# Patient Record
Sex: MALE | Race: WHITE | NOT HISPANIC OR LATINO | Employment: OTHER | URBAN - METROPOLITAN AREA
[De-identification: names, ages, dates, MRNs, and addresses within clinical notes are randomized per-mention and may not be internally consistent; named-entity substitution may affect disease eponyms.]

---

## 2020-07-23 ENCOUNTER — AMBULATORY - RIVER FALLS (OUTPATIENT)
Dept: FAMILY MEDICINE | Facility: CLINIC | Age: 80
End: 2020-07-23

## 2021-12-21 DIAGNOSIS — Z11.59 ENCOUNTER FOR SCREENING FOR OTHER VIRAL DISEASES: ICD-10-CM

## 2021-12-27 RX ORDER — LOSARTAN POTASSIUM 50 MG/1
50 TABLET ORAL DAILY
COMMUNITY

## 2021-12-27 RX ORDER — HYDROCHLOROTHIAZIDE 12.5 MG/1
12.5 TABLET ORAL AT BEDTIME
COMMUNITY

## 2021-12-27 RX ORDER — GINGER ROOT 550 MG
550 CAPSULE ORAL DAILY
COMMUNITY

## 2021-12-27 RX ORDER — MULTIPLE VITAMINS W/ MINERALS TAB 9MG-400MCG
1 TAB ORAL DAILY
COMMUNITY

## 2021-12-27 RX ORDER — SODIUM PHOSPHATE,MONO-DIBASIC 19G-7G/118
500 ENEMA (ML) RECTAL DAILY
COMMUNITY

## 2021-12-27 RX ORDER — HYDROCODONE BITARTRATE AND ACETAMINOPHEN 5; 325 MG/1; MG/1
1 TABLET ORAL EVERY 6 HOURS PRN
COMMUNITY
End: 2021-12-27

## 2021-12-27 ASSESSMENT — MIFFLIN-ST. JEOR: SCORE: 1813.49

## 2021-12-27 NOTE — PROVIDER NOTIFICATION
Plans to discharge to home on POD 1 in the morning with his wife and his son will also be helping him.       12/27/21 1413   Discharge Planning   Patient/Family Anticipates Transition to home with family   Living Arrangements   People in home spouse   Type of Residence Private Residence   Is your private residence a single family home or apartment? Single family home   Number of Stairs, Within Home, Primary 3   Once home, are you able to live on one level? Yes   Which level? Main Level   Bathroom Shower/Tub Walk-in shower   Equipment Currently Used at Home grab bar, tub/shower;shower chair;grab bar, toilet;raised toilet seat  (Has a cane and a walker to use after surgery)   Support System   Support Systems Spouse/Significant Other;Children  (wife, Jessica, and son, Cornelio)   Do you have someone available to stay with you one or two nights once you are home? Yes   Medical Clearance   Clinic Name Cardiologist   It is recommended that you call and check with any specialty providers before surgery to see if you need surgical clearance.  Do you see any specialty providers outside of your primary care provider? Yes   Blood   Known bleeding disorder or coagulopathy? No   Education   Patient attended total joint pre-op class/received pre-op teaching  email/phone call

## 2022-01-03 LAB — EJECTION FRACTION: 32 %

## 2022-01-04 ENCOUNTER — LAB (OUTPATIENT)
Dept: LAB | Facility: CLINIC | Age: 82
End: 2022-01-04
Attending: ORTHOPAEDIC SURGERY
Payer: MEDICARE

## 2022-01-04 DIAGNOSIS — Z11.59 ENCOUNTER FOR SCREENING FOR OTHER VIRAL DISEASES: ICD-10-CM

## 2022-01-04 PROCEDURE — U0005 INFEC AGEN DETEC AMPLI PROBE: HCPCS

## 2022-01-04 PROCEDURE — U0003 INFECTIOUS AGENT DETECTION BY NUCLEIC ACID (DNA OR RNA); SEVERE ACUTE RESPIRATORY SYNDROME CORONAVIRUS 2 (SARS-COV-2) (CORONAVIRUS DISEASE [COVID-19]), AMPLIFIED PROBE TECHNIQUE, MAKING USE OF HIGH THROUGHPUT TECHNOLOGIES AS DESCRIBED BY CMS-2020-01-R: HCPCS

## 2022-01-05 LAB — SARS-COV-2 RNA RESP QL NAA+PROBE: NEGATIVE

## 2022-01-06 ENCOUNTER — ANESTHESIA EVENT (OUTPATIENT)
Dept: SURGERY | Facility: CLINIC | Age: 82
End: 2022-01-06
Payer: MEDICARE

## 2022-01-06 NOTE — TREATMENT PLAN
Orthopedic Surgery Pre-Op Plan: Michael Crane  pre-op review. This is NOT an H&P   Surgeon: Dr. Shah   [facility]: [facility]  Name of Surgery: Left Total Knee Arthroplasty   Date of Surgery: 1/7/22  H&P: Completed 12/29/21 by Pao Conrad CNP, at [facility]  History of ASA, NSAIDS, vitamin and/or herbal supplements within 10 days: Yes- Apple Cider Vinegar, Emely Root, Glucosamine, Multivitamin, Prostata Prostate supplement, Saw Palmetto- instructed to hold these supplements for 7 days before surgery.   History of blood thinners: Yes- on apixaban (Eliquis) for Atrial Fibrillation. Instructed to hold Eliquis for 3 days before surgery (last dose at 0700 on 1/4/22, then held), to allow for possible spinal anesthesia.     Plan:   1) Discharge Plan: Home POD 1 with assist of Wife and Son. Please see Discharge Planning section near bottom of this note for further details.     2) Paroxysmal Atrial Fibrillation: Recently diagnosed in Nov 2021. Heart rate well-controlled and patient denies any symptoms. Echo on 11/17/21: showed normal systolic function with LVEF of 55%. Mild concentric increased left ventricular wall thickness. No definite wall motion abnormality. No significant valvular abnormalities. On Eliquis for stroke prevention. Cardiology ok with 2 or 3 day Eliquis hold before knee surgery. After surgery will resume Eliquis as soon as safe from a surgical bleeding standpoint per Dr. Shah.     3) PVCs with NSVT: recent Holter monitor showed 28.9% ventricular ectopy.     4) Dyspnea on Exertion: NM Stress Test on 1/3/22: showed no evidence of infarct or ischemia. Per cardiology: ok for patient to proceed with knee surgery from a heart standpoint.     5) Hyperlipidemia: On atorvastatin.    6) Hypertension: Appears well controlled on losartan and hydrochlorothiazide.  Patient instructed to hold losartan and hydrochlorothiazide on the morning of surgery.    7) Obstructive Sleep Apnea: patient declined to use  CPAP.  I recommend close monitoring of postop respiratory status.  If frequent O2 desats or apneic episodes, nursing to please notify hospitalist.    8) Type 2 Diabetes Mellitus: A1c 6.4 on 12/28/2021.  Shows good recent control without any medications. I recommend blood glucose checks at least three times a day and at bedtime during hospital stay. Goal BG < 180 to decrease risk for infection and wound healing complications. Nursing to please notify Hospitalist if BG > 180.      9) PONV:  I recommend patient discusses this with preop nursing and anesthesia.  Would likely benefit from antiemetics and other measures to prevent or minimize nausea/vomiting.    10) Benign Prostatic Hypertrophy (BPH): At increased risk for postop urinary retention.  I recommend close monitoring with frequent bladder scanning as needed.    Patient appears medically optimized for upcoming surgery. I would recommend Hospitalist Consult to assist with medical management. Please call me below with any questions on this patient.       Review of Systems Notable for: Paroxysmal atrial fibrillation, PVCs with NSVT, dyspnea on exertion-negative stress test 1/3/2022-cleared by cardiology for surgery, hyperlipidemia, hypertension, obstructive sleep apnea-does not use CPAP, type 2 diabetes mellitus, PONV, BPH.    Past Medical History:   Past Medical History:   Diagnosis Date     Antiplatelet or antithrombotic long-term use      Arthritis      Atrial fibrillation (H)      BPH (benign prostatic hyperplasia)      Diverticulosis      Hyperlipidemia      Hypertension      Kidney stones      Left ventricular hypertrophy      Obese      PONV (postoperative nausea and vomiting)      PVCs (premature ventricular contractions)      Sleep apnea     borderline MEGAN     Past Surgical History:   Procedure Laterality Date     CARPAL TUNNEL RELEASE RT/LT Left 02/2021     COLONOSCOPY  2017     HERNIA REPAIR      2004 and 2007     PROSTATE BIOPSY       TURP        URETEROSCOPY  2018    with removal of kidney stone       Current Medications:  Patient's Medications   New Prescriptions    No medications on file   Previous Medications    APIXABAN ANTICOAGULANT (ELIQUIS) 5 MG TABLET    Take 5 mg by mouth 2 times daily Stopping 1/3/22 before surgery    APPLE CIDER VINEGAR PO    Take 1 chew tab by mouth 3 times daily as needed     DIPHENHYDRAMINE-ACETAMINOPHEN (TYLENOL PM)  MG TABLET    Take 1 tablet by mouth nightly as needed for sleep    GINGER, ZINGIBER OFFICINALIS, (GINGER ROOT) 550 MG CAPS CAPSULE    Take 550 mg by mouth daily    GLUCOSAMINE 500 MG CAPS CAPSULE    Take 500 mg by mouth daily Stopping 12/31/21 before surgery    HYDROCHLOROTHIAZIDE (HYDRODIURIL) 12.5 MG TABLET    Take 12.5 mg by mouth daily    LOSARTAN (COZAAR) 50 MG TABLET    Take 50 mg by mouth daily    MULTIVITAMIN W/MINERALS (THERA-VIT-M) TABLET    Take 1 tablet by mouth daily Stopping 12/31/21 before surgery    SPECIALTY VITAMINS PRODUCTS (PROSTATE PO)    Take 1 capsule by mouth daily Prostata supplement   Stopping 12/31/21 before surgery    ZN-PYG AFRI-NETTLE-SAW PALMET (SAW PALMETTO COMPLEX PO)    Take 1 tablet by mouth daily Stopping 12/31/21 before surgery   Modified Medications    No medications on file   Discontinued Medications    ASPIRIN-CAFFEINE (ANACIN PO)    Take 1 tablet by mouth 3 times daily as needed    HYDROCODONE-ACETAMINOPHEN (NORCO) 5-325 MG TABLET    Take 1 tablet by mouth every 6 hours as needed for pain (0.5 to 1 tablet)       ALLERGIES:  Allergies   Allergen Reactions     Hydromorphone Nausea and Vomiting     Penicillins Rash       Social History  Social History     Tobacco Use     Smoking status: Former Smoker     Smokeless tobacco: Never Used   Substance Use Topics     Alcohol use: Yes     Comment: occasional     Drug use: Never       Any Abnormal Recent Diagnostics? Yes  Hemoglobin A1c 6.4 on 12/28/2021: Shows good control of type 2 diabetes without any medications.  Blood  glucose 124 on 12/28/2021: Has type 2 diabetes-we will monitor BG's closely during hospital stay.  Goal BG <180 to decrease risk for infection and postop wound healing complications.    Discharge Planning:   Plans to discharge to home on POD 1 in the morning with his wife and his son will also be helping him.        12/27/21 1413   Discharge Planning   Patient/Family Anticipates Transition to home with family   Living Arrangements   People in home spouse   Type of Residence Private Residence   Is your private residence a single family home or apartment? Single family home   Number of Stairs, Within Home, Primary 3   Once home, are you able to live on one level? Yes   Which level? Main Level   Bathroom Shower/Tub Walk-in shower   Equipment Currently Used at Home grab bar, tub/shower;shower chair;grab bar, toilet;raised toilet seat  (Has a cane and a walker to use after surgery)   Support System   Support Systems Spouse/Significant Other;Children  (wife, Jessica, and son, Cornelio)   Do you have someone available to stay with you one or two nights once you are home? Yes   Medical Clearance   Clinic Name Cardiologist   It is recommended that you call and check with any specialty providers before surgery to see if you need surgical clearance.  Do you see any specialty providers outside of your primary care provider? Yes   Blood   Known bleeding disorder or coagulopathy? No   Education   Patient attended total joint pre-op class/received pre-op teaching  email/phone call            ROSELINE Shah CNP   Advanced Practice Nurse Navigator- Orthopedics  [facility]   Phone: [phone]

## 2022-01-07 ENCOUNTER — APPOINTMENT (OUTPATIENT)
Dept: PHYSICAL THERAPY | Facility: CLINIC | Age: 82
End: 2022-01-07
Attending: ORTHOPAEDIC SURGERY
Payer: MEDICARE

## 2022-01-07 ENCOUNTER — HOSPITAL ENCOUNTER (OUTPATIENT)
Facility: CLINIC | Age: 82
Discharge: HOME | End: 2022-01-08
Attending: ORTHOPAEDIC SURGERY | Admitting: ORTHOPAEDIC SURGERY
Payer: MEDICARE

## 2022-01-07 ENCOUNTER — ANESTHESIA (OUTPATIENT)
Dept: SURGERY | Facility: CLINIC | Age: 82
End: 2022-01-07
Payer: MEDICARE

## 2022-01-07 DIAGNOSIS — M17.12 PRIMARY OSTEOARTHRITIS OF LEFT KNEE: Primary | ICD-10-CM

## 2022-01-07 DIAGNOSIS — Z96.652 STATUS POST TOTAL LEFT KNEE REPLACEMENT: Chronic | ICD-10-CM

## 2022-01-07 PROBLEM — N20.1 URETERAL STONE: Status: ACTIVE | Noted: 2018-07-31

## 2022-01-07 PROBLEM — M19.90 OA (OSTEOARTHRITIS): Chronic | Status: ACTIVE | Noted: 2022-01-07

## 2022-01-07 PROBLEM — E11.9 DIABETES MELLITUS (H): Status: ACTIVE | Noted: 2022-01-07

## 2022-01-07 PROBLEM — M19.90 OA (OSTEOARTHRITIS): Status: ACTIVE | Noted: 2022-01-07

## 2022-01-07 PROBLEM — I10 HYPERTENSION: Status: ACTIVE | Noted: 2022-01-07

## 2022-01-07 PROBLEM — N40.0 BENIGN PROSTATIC HYPERPLASIA: Status: ACTIVE | Noted: 2022-01-07

## 2022-01-07 PROBLEM — Z79.01 CHRONIC ANTICOAGULATION: Status: ACTIVE | Noted: 2022-01-07

## 2022-01-07 PROBLEM — E78.5 HYPERLIPIDEMIA: Status: ACTIVE | Noted: 2021-12-28

## 2022-01-07 PROBLEM — I48.91 ATRIAL FIBRILLATION (H): Status: ACTIVE | Noted: 2021-12-28

## 2022-01-07 PROBLEM — G47.33 OBSTRUCTIVE SLEEP APNEA SYNDROME: Status: ACTIVE | Noted: 2022-01-07

## 2022-01-07 PROBLEM — Z79.01 CHRONIC ANTICOAGULATION: Chronic | Status: ACTIVE | Noted: 2022-01-07

## 2022-01-07 PROBLEM — E11.9 DIABETES MELLITUS (H): Chronic | Status: ACTIVE | Noted: 2022-01-07

## 2022-01-07 PROBLEM — I48.91 ATRIAL FIBRILLATION (H): Chronic | Status: ACTIVE | Noted: 2021-12-28

## 2022-01-07 PROBLEM — I10 HYPERTENSION: Chronic | Status: ACTIVE | Noted: 2022-01-07

## 2022-01-07 LAB
ATRIAL RATE - MUSE: 54 BPM
DIASTOLIC BLOOD PRESSURE - MUSE: NORMAL MMHG
GLUCOSE BLDC GLUCOMTR-MCNC: 126 MG/DL (ref 70–99)
GLUCOSE BLDC GLUCOMTR-MCNC: 134 MG/DL (ref 70–99)
GLUCOSE BLDC GLUCOMTR-MCNC: 142 MG/DL (ref 70–99)
INTERPRETATION ECG - MUSE: NORMAL
P AXIS - MUSE: 58 DEGREES
PR INTERVAL - MUSE: 196 MS
QRS DURATION - MUSE: 180 MS
QT - MUSE: 468 MS
QTC - MUSE: 443 MS
R AXIS - MUSE: 58 DEGREES
SYSTOLIC BLOOD PRESSURE - MUSE: NORMAL MMHG
T AXIS - MUSE: 51 DEGREES
VENTRICULAR RATE- MUSE: 54 BPM

## 2022-01-07 PROCEDURE — 97116 GAIT TRAINING THERAPY: CPT | Mod: GP

## 2022-01-07 PROCEDURE — 250N000009 HC RX 250: Performed by: NURSE ANESTHETIST, CERTIFIED REGISTERED

## 2022-01-07 PROCEDURE — 250N000011 HC RX IP 250 OP 636: Performed by: ANESTHESIOLOGY

## 2022-01-07 PROCEDURE — 710N000010 HC RECOVERY PHASE 1, LEVEL 2, PER MIN: Performed by: ORTHOPAEDIC SURGERY

## 2022-01-07 PROCEDURE — 250N000013 HC RX MED GY IP 250 OP 250 PS 637: Performed by: ORTHOPAEDIC SURGERY

## 2022-01-07 PROCEDURE — 250N000009 HC RX 250: Performed by: ORTHOPAEDIC SURGERY

## 2022-01-07 PROCEDURE — 250N000013 HC RX MED GY IP 250 OP 250 PS 637: Performed by: ANESTHESIOLOGY

## 2022-01-07 PROCEDURE — 250N000011 HC RX IP 250 OP 636: Performed by: NURSE ANESTHETIST, CERTIFIED REGISTERED

## 2022-01-07 PROCEDURE — 97110 THERAPEUTIC EXERCISES: CPT | Mod: GP

## 2022-01-07 PROCEDURE — 360N000077 HC SURGERY LEVEL 4, PER MIN: Performed by: ORTHOPAEDIC SURGERY

## 2022-01-07 PROCEDURE — 93005 ELECTROCARDIOGRAM TRACING: CPT

## 2022-01-07 PROCEDURE — 82962 GLUCOSE BLOOD TEST: CPT | Mod: 91

## 2022-01-07 PROCEDURE — 93010 ELECTROCARDIOGRAM REPORT: CPT | Mod: HOP | Performed by: GENERAL ACUTE CARE HOSPITAL

## 2022-01-07 PROCEDURE — 250N000009 HC RX 250: Performed by: ANESTHESIOLOGY

## 2022-01-07 PROCEDURE — 250N000009 HC RX 250: Performed by: PHYSICIAN ASSISTANT

## 2022-01-07 PROCEDURE — 97162 PT EVAL MOD COMPLEX 30 MIN: CPT | Mod: GP

## 2022-01-07 PROCEDURE — 370N000017 HC ANESTHESIA TECHNICAL FEE, PER MIN: Performed by: ORTHOPAEDIC SURGERY

## 2022-01-07 PROCEDURE — 258N000003 HC RX IP 258 OP 636: Performed by: ANESTHESIOLOGY

## 2022-01-07 PROCEDURE — 250N000011 HC RX IP 250 OP 636: Performed by: ORTHOPAEDIC SURGERY

## 2022-01-07 PROCEDURE — C1776 JOINT DEVICE (IMPLANTABLE): HCPCS | Performed by: ORTHOPAEDIC SURGERY

## 2022-01-07 PROCEDURE — 999N000141 HC STATISTIC PRE-PROCEDURE NURSING ASSESSMENT: Performed by: ORTHOPAEDIC SURGERY

## 2022-01-07 PROCEDURE — 258N000003 HC RX IP 258 OP 636: Performed by: ORTHOPAEDIC SURGERY

## 2022-01-07 PROCEDURE — 258N000001 HC RX 258: Performed by: ORTHOPAEDIC SURGERY

## 2022-01-07 PROCEDURE — 258N000003 HC RX IP 258 OP 636: Performed by: NURSE ANESTHETIST, CERTIFIED REGISTERED

## 2022-01-07 PROCEDURE — 272N000001 HC OR GENERAL SUPPLY STERILE: Performed by: ORTHOPAEDIC SURGERY

## 2022-01-07 PROCEDURE — 250N000013 HC RX MED GY IP 250 OP 250 PS 637: Performed by: PHYSICIAN ASSISTANT

## 2022-01-07 PROCEDURE — 278N000051 HC OR IMPLANT GENERAL: Performed by: ORTHOPAEDIC SURGERY

## 2022-01-07 PROCEDURE — 250N000011 HC RX IP 250 OP 636: Performed by: PHYSICIAN ASSISTANT

## 2022-01-07 PROCEDURE — 99219 PR INITIAL OBSERVATION CARE,LEVEL II: CPT | Performed by: INTERNAL MEDICINE

## 2022-01-07 DEVICE — CEMENT BONE SIMPLEX HV 40G W/GENTA .5G: Type: IMPLANTABLE DEVICE | Site: KNEE | Status: FUNCTIONAL

## 2022-01-07 DEVICE — LEGION CRUCIATE RETAINING                                    NONPOROUS FEMORAL SIZE 6 LEFT
Type: IMPLANTABLE DEVICE | Site: KNEE | Status: FUNCTIONAL
Brand: LEGION

## 2022-01-07 DEVICE — GENESIS II NON-POROUS TIBIAL                                    BASEPLATE SIZE 6 LT
Type: IMPLANTABLE DEVICE | Site: KNEE | Status: FUNCTIONAL
Brand: GENESIS II

## 2022-01-07 DEVICE — LEGION CRUCIATE RETAINING HIGH                                    FLEX HIGHLY CROSS LINKED                                    POLYETHYLENE SIZE 5-6 11MM
Type: IMPLANTABLE DEVICE | Site: KNEE | Status: FUNCTIONAL
Brand: LEGION

## 2022-01-07 DEVICE — GENESIS II RESURFACING PATELLAR 35MM
Type: IMPLANTABLE DEVICE | Site: KNEE | Status: FUNCTIONAL
Brand: GENESIS II

## 2022-01-07 RX ORDER — OXYCODONE HYDROCHLORIDE 5 MG/1
5 TABLET ORAL EVERY 4 HOURS PRN
Status: DISCONTINUED | OUTPATIENT
Start: 2022-01-07 | End: 2022-01-07 | Stop reason: HOSPADM

## 2022-01-07 RX ORDER — FENTANYL CITRATE 50 UG/ML
50 INJECTION, SOLUTION INTRAMUSCULAR; INTRAVENOUS
Status: DISCONTINUED | OUTPATIENT
Start: 2022-01-07 | End: 2022-01-07

## 2022-01-07 RX ORDER — AMOXICILLIN 250 MG
1 CAPSULE ORAL 2 TIMES DAILY
Status: DISCONTINUED | OUTPATIENT
Start: 2022-01-07 | End: 2022-01-08 | Stop reason: HOSPADM

## 2022-01-07 RX ORDER — CEFAZOLIN SODIUM 2 G/100ML
2 INJECTION, SOLUTION INTRAVENOUS EVERY 8 HOURS
Status: COMPLETED | OUTPATIENT
Start: 2022-01-07 | End: 2022-01-08

## 2022-01-07 RX ORDER — OXYCODONE HYDROCHLORIDE 5 MG/1
5 TABLET ORAL EVERY 4 HOURS PRN
Status: DISCONTINUED | OUTPATIENT
Start: 2022-01-07 | End: 2022-01-08 | Stop reason: HOSPADM

## 2022-01-07 RX ORDER — FENTANYL CITRATE 50 UG/ML
INJECTION, SOLUTION INTRAMUSCULAR; INTRAVENOUS PRN
Status: DISCONTINUED | OUTPATIENT
Start: 2022-01-07 | End: 2022-01-07

## 2022-01-07 RX ORDER — HYDROCHLOROTHIAZIDE 12.5 MG/1
12.5 CAPSULE ORAL AT BEDTIME
Status: DISCONTINUED | OUTPATIENT
Start: 2022-01-07 | End: 2022-01-08 | Stop reason: HOSPADM

## 2022-01-07 RX ORDER — ONDANSETRON 4 MG/1
4 TABLET, ORALLY DISINTEGRATING ORAL EVERY 30 MIN PRN
Status: DISCONTINUED | OUTPATIENT
Start: 2022-01-07 | End: 2022-01-07 | Stop reason: HOSPADM

## 2022-01-07 RX ORDER — ATORVASTATIN CALCIUM 20 MG/1
20 TABLET, FILM COATED ORAL DAILY
COMMUNITY

## 2022-01-07 RX ORDER — LOSARTAN POTASSIUM 50 MG/1
50 TABLET ORAL DAILY
Status: DISCONTINUED | OUTPATIENT
Start: 2022-01-08 | End: 2022-01-08 | Stop reason: HOSPADM

## 2022-01-07 RX ORDER — ACETAMINOPHEN 325 MG/1
650 TABLET ORAL EVERY 4 HOURS PRN
Status: DISCONTINUED | OUTPATIENT
Start: 2022-01-10 | End: 2022-01-08 | Stop reason: HOSPADM

## 2022-01-07 RX ORDER — SODIUM CHLORIDE, SODIUM LACTATE, POTASSIUM CHLORIDE, CALCIUM CHLORIDE 600; 310; 30; 20 MG/100ML; MG/100ML; MG/100ML; MG/100ML
INJECTION, SOLUTION INTRAVENOUS CONTINUOUS
Status: DISCONTINUED | OUTPATIENT
Start: 2022-01-07 | End: 2022-01-07

## 2022-01-07 RX ORDER — CEFAZOLIN SODIUM 2 G/100ML
2 INJECTION, SOLUTION INTRAVENOUS SEE ADMIN INSTRUCTIONS
Status: DISCONTINUED | OUTPATIENT
Start: 2022-01-07 | End: 2022-01-07

## 2022-01-07 RX ORDER — ONDANSETRON 2 MG/ML
INJECTION INTRAMUSCULAR; INTRAVENOUS PRN
Status: DISCONTINUED | OUTPATIENT
Start: 2022-01-07 | End: 2022-01-07

## 2022-01-07 RX ORDER — HYDROMORPHONE HCL IN WATER/PF 6 MG/30 ML
0.4 PATIENT CONTROLLED ANALGESIA SYRINGE INTRAVENOUS
Status: DISCONTINUED | OUTPATIENT
Start: 2022-01-07 | End: 2022-01-08 | Stop reason: HOSPADM

## 2022-01-07 RX ORDER — ACETAMINOPHEN 325 MG/1
975 TABLET ORAL EVERY 8 HOURS
Status: DISCONTINUED | OUTPATIENT
Start: 2022-01-07 | End: 2022-01-08 | Stop reason: HOSPADM

## 2022-01-07 RX ORDER — PROCHLORPERAZINE MALEATE 5 MG
5 TABLET ORAL EVERY 6 HOURS PRN
Status: DISCONTINUED | OUTPATIENT
Start: 2022-01-07 | End: 2022-01-08 | Stop reason: HOSPADM

## 2022-01-07 RX ORDER — LIDOCAINE 40 MG/G
CREAM TOPICAL
Status: DISCONTINUED | OUTPATIENT
Start: 2022-01-07 | End: 2022-01-07

## 2022-01-07 RX ORDER — FENTANYL CITRATE 50 UG/ML
25 INJECTION, SOLUTION INTRAMUSCULAR; INTRAVENOUS EVERY 5 MIN PRN
Status: DISCONTINUED | OUTPATIENT
Start: 2022-01-07 | End: 2022-01-07 | Stop reason: HOSPADM

## 2022-01-07 RX ORDER — POLYETHYLENE GLYCOL 3350 17 G/17G
17 POWDER, FOR SOLUTION ORAL DAILY
Status: DISCONTINUED | OUTPATIENT
Start: 2022-01-08 | End: 2022-01-08 | Stop reason: HOSPADM

## 2022-01-07 RX ORDER — HYDROMORPHONE HCL IN WATER/PF 6 MG/30 ML
0.2 PATIENT CONTROLLED ANALGESIA SYRINGE INTRAVENOUS EVERY 5 MIN PRN
Status: DISCONTINUED | OUTPATIENT
Start: 2022-01-07 | End: 2022-01-07 | Stop reason: HOSPADM

## 2022-01-07 RX ORDER — MAGNESIUM HYDROXIDE 1200 MG/15ML
LIQUID ORAL PRN
Status: DISCONTINUED | OUTPATIENT
Start: 2022-01-07 | End: 2022-01-07 | Stop reason: HOSPADM

## 2022-01-07 RX ORDER — HYDROCHLOROTHIAZIDE 12.5 MG/1
12.5 TABLET ORAL AT BEDTIME
Status: DISCONTINUED | OUTPATIENT
Start: 2022-01-07 | End: 2022-01-07 | Stop reason: ALTCHOICE

## 2022-01-07 RX ORDER — BUPIVACAINE HYDROCHLORIDE 5 MG/ML
INJECTION, SOLUTION PERINEURAL PRN
Status: DISCONTINUED | OUTPATIENT
Start: 2022-01-07 | End: 2022-01-07

## 2022-01-07 RX ORDER — PROPOFOL 10 MG/ML
INJECTION, EMULSION INTRAVENOUS CONTINUOUS PRN
Status: DISCONTINUED | OUTPATIENT
Start: 2022-01-07 | End: 2022-01-07

## 2022-01-07 RX ORDER — SODIUM CHLORIDE, SODIUM LACTATE, POTASSIUM CHLORIDE, CALCIUM CHLORIDE 600; 310; 30; 20 MG/100ML; MG/100ML; MG/100ML; MG/100ML
INJECTION, SOLUTION INTRAVENOUS CONTINUOUS
Status: DISCONTINUED | OUTPATIENT
Start: 2022-01-07 | End: 2022-01-07 | Stop reason: HOSPADM

## 2022-01-07 RX ORDER — ACETAMINOPHEN 325 MG/1
650 TABLET ORAL EVERY 4 HOURS PRN
Qty: 60 TABLET | Refills: 0 | COMMUNITY
Start: 2022-01-07

## 2022-01-07 RX ORDER — OXYCODONE HYDROCHLORIDE 5 MG/1
10 TABLET ORAL EVERY 4 HOURS PRN
Status: DISCONTINUED | OUTPATIENT
Start: 2022-01-07 | End: 2022-01-08 | Stop reason: HOSPADM

## 2022-01-07 RX ORDER — ONDANSETRON 2 MG/ML
4 INJECTION INTRAMUSCULAR; INTRAVENOUS EVERY 6 HOURS PRN
Status: DISCONTINUED | OUTPATIENT
Start: 2022-01-07 | End: 2022-01-08 | Stop reason: HOSPADM

## 2022-01-07 RX ORDER — EPHEDRINE SULFATE 50 MG/ML
INJECTION, SOLUTION INTRAMUSCULAR; INTRAVENOUS; SUBCUTANEOUS PRN
Status: DISCONTINUED | OUTPATIENT
Start: 2022-01-07 | End: 2022-01-07

## 2022-01-07 RX ORDER — ATORVASTATIN CALCIUM 10 MG/1
20 TABLET, FILM COATED ORAL DAILY
Status: DISCONTINUED | OUTPATIENT
Start: 2022-01-08 | End: 2022-01-08 | Stop reason: HOSPADM

## 2022-01-07 RX ORDER — HYDROMORPHONE HCL IN WATER/PF 6 MG/30 ML
0.2 PATIENT CONTROLLED ANALGESIA SYRINGE INTRAVENOUS
Status: DISCONTINUED | OUTPATIENT
Start: 2022-01-07 | End: 2022-01-08 | Stop reason: HOSPADM

## 2022-01-07 RX ORDER — ASPIRIN 81 MG/1
81 TABLET ORAL 2 TIMES DAILY
Status: DISCONTINUED | OUTPATIENT
Start: 2022-01-07 | End: 2022-01-07 | Stop reason: ALTCHOICE

## 2022-01-07 RX ORDER — MAGNESIUM SULFATE 4 G/50ML
4 INJECTION INTRAVENOUS ONCE
Status: COMPLETED | OUTPATIENT
Start: 2022-01-07 | End: 2022-01-07

## 2022-01-07 RX ORDER — ONDANSETRON 2 MG/ML
4 INJECTION INTRAMUSCULAR; INTRAVENOUS EVERY 30 MIN PRN
Status: DISCONTINUED | OUTPATIENT
Start: 2022-01-07 | End: 2022-01-07 | Stop reason: HOSPADM

## 2022-01-07 RX ORDER — LIDOCAINE 40 MG/G
CREAM TOPICAL
Status: DISCONTINUED | OUTPATIENT
Start: 2022-01-07 | End: 2022-01-08 | Stop reason: HOSPADM

## 2022-01-07 RX ORDER — SODIUM CHLORIDE, SODIUM LACTATE, POTASSIUM CHLORIDE, CALCIUM CHLORIDE 600; 310; 30; 20 MG/100ML; MG/100ML; MG/100ML; MG/100ML
INJECTION, SOLUTION INTRAVENOUS CONTINUOUS
Status: DISCONTINUED | OUTPATIENT
Start: 2022-01-07 | End: 2022-01-08

## 2022-01-07 RX ORDER — TRANEXAMIC ACID 650 MG/1
1950 TABLET ORAL ONCE
Status: COMPLETED | OUTPATIENT
Start: 2022-01-07 | End: 2022-01-07

## 2022-01-07 RX ORDER — ACETAMINOPHEN 325 MG/1
975 TABLET ORAL ONCE
Status: DISCONTINUED | OUTPATIENT
Start: 2022-01-07 | End: 2022-01-07

## 2022-01-07 RX ORDER — CEFAZOLIN SODIUM 2 G/100ML
2 INJECTION, SOLUTION INTRAVENOUS
Status: COMPLETED | OUTPATIENT
Start: 2022-01-07 | End: 2022-01-07

## 2022-01-07 RX ORDER — BISACODYL 10 MG
10 SUPPOSITORY, RECTAL RECTAL DAILY PRN
Status: DISCONTINUED | OUTPATIENT
Start: 2022-01-07 | End: 2022-01-08 | Stop reason: HOSPADM

## 2022-01-07 RX ORDER — OXYCODONE HYDROCHLORIDE 5 MG/1
5 TABLET ORAL EVERY 4 HOURS PRN
Status: DISCONTINUED | OUTPATIENT
Start: 2022-01-07 | End: 2022-01-07

## 2022-01-07 RX ORDER — ONDANSETRON 4 MG/1
4 TABLET, ORALLY DISINTEGRATING ORAL EVERY 6 HOURS PRN
Status: DISCONTINUED | OUTPATIENT
Start: 2022-01-07 | End: 2022-01-08 | Stop reason: HOSPADM

## 2022-01-07 RX ORDER — ACETAMINOPHEN 325 MG/1
975 TABLET ORAL ONCE
Status: COMPLETED | OUTPATIENT
Start: 2022-01-07 | End: 2022-01-07

## 2022-01-07 RX ORDER — LIDOCAINE HYDROCHLORIDE 10 MG/ML
INJECTION, SOLUTION INFILTRATION; PERINEURAL PRN
Status: DISCONTINUED | OUTPATIENT
Start: 2022-01-07 | End: 2022-01-07

## 2022-01-07 RX ORDER — BUPIVACAINE HYDROCHLORIDE 7.5 MG/ML
INJECTION, SOLUTION INTRASPINAL
Status: COMPLETED | OUTPATIENT
Start: 2022-01-07 | End: 2022-01-07

## 2022-01-07 RX ADMIN — CEFAZOLIN SODIUM 2 G: 2 INJECTION, SOLUTION INTRAVENOUS at 17:48

## 2022-01-07 RX ADMIN — SENNOSIDES AND DOCUSATE SODIUM 1 TABLET: 50; 8.6 TABLET ORAL at 20:02

## 2022-01-07 RX ADMIN — FENTANYL CITRATE 50 MCG: 50 INJECTION, SOLUTION INTRAMUSCULAR; INTRAVENOUS at 07:32

## 2022-01-07 RX ADMIN — MAGNESIUM SULFATE HEPTAHYDRATE 4 G: 80 INJECTION, SOLUTION INTRAVENOUS at 06:40

## 2022-01-07 RX ADMIN — SODIUM CHLORIDE, POTASSIUM CHLORIDE, SODIUM LACTATE AND CALCIUM CHLORIDE: 600; 310; 30; 20 INJECTION, SOLUTION INTRAVENOUS at 20:08

## 2022-01-07 RX ADMIN — TRANEXAMIC ACID 1950 MG: 650 TABLET ORAL at 06:27

## 2022-01-07 RX ADMIN — ACETAMINOPHEN 975 MG: 325 TABLET ORAL at 19:57

## 2022-01-07 RX ADMIN — FENTANYL CITRATE 50 MCG: 50 INJECTION INTRAMUSCULAR; INTRAVENOUS at 06:46

## 2022-01-07 RX ADMIN — PHENYLEPHRINE HYDROCHLORIDE 0.3 MCG/KG/MIN: 10 INJECTION INTRAVENOUS at 07:54

## 2022-01-07 RX ADMIN — SODIUM CHLORIDE, POTASSIUM CHLORIDE, SODIUM LACTATE AND CALCIUM CHLORIDE: 600; 310; 30; 20 INJECTION, SOLUTION INTRAVENOUS at 06:28

## 2022-01-07 RX ADMIN — PHENYLEPHRINE HYDROCHLORIDE 100 MCG: 10 INJECTION INTRAVENOUS at 07:51

## 2022-01-07 RX ADMIN — PROPOFOL 75 MCG/KG/MIN: 10 INJECTION, EMULSION INTRAVENOUS at 07:41

## 2022-01-07 RX ADMIN — Medication 5 MG: at 08:30

## 2022-01-07 RX ADMIN — SODIUM CHLORIDE, POTASSIUM CHLORIDE, SODIUM LACTATE AND CALCIUM CHLORIDE 100 ML: 600; 310; 30; 20 INJECTION, SOLUTION INTRAVENOUS at 09:51

## 2022-01-07 RX ADMIN — PHENYLEPHRINE HYDROCHLORIDE 100 MCG: 10 INJECTION INTRAVENOUS at 07:55

## 2022-01-07 RX ADMIN — BUPIVACAINE HYDROCHLORIDE IN DEXTROSE 1.8 ML: 7.5 INJECTION, SOLUTION SUBARACHNOID at 07:36

## 2022-01-07 RX ADMIN — ACETAMINOPHEN 975 MG: 325 TABLET ORAL at 12:21

## 2022-01-07 RX ADMIN — BUPIVACAINE HYDROCHLORIDE 15 ML: 5 INJECTION, SOLUTION PERINEURAL at 06:52

## 2022-01-07 RX ADMIN — LIDOCAINE HYDROCHLORIDE 3 ML: 10 INJECTION, SOLUTION INFILTRATION; PERINEURAL at 07:41

## 2022-01-07 RX ADMIN — ACETAMINOPHEN 975 MG: 325 TABLET ORAL at 06:21

## 2022-01-07 RX ADMIN — ONDANSETRON 4 MG: 2 INJECTION INTRAMUSCULAR; INTRAVENOUS at 08:34

## 2022-01-07 RX ADMIN — Medication 5 MG: at 08:13

## 2022-01-07 RX ADMIN — CEFAZOLIN SODIUM 2 G: 2 INJECTION, SOLUTION INTRAVENOUS at 07:44

## 2022-01-07 RX ADMIN — Medication 5 MG: at 08:05

## 2022-01-07 RX ADMIN — MIDAZOLAM HYDROCHLORIDE 1 MG: 1 INJECTION, SOLUTION INTRAMUSCULAR; INTRAVENOUS at 06:46

## 2022-01-07 RX ADMIN — SODIUM CHLORIDE, POTASSIUM CHLORIDE, SODIUM LACTATE AND CALCIUM CHLORIDE: 600; 310; 30; 20 INJECTION, SOLUTION INTRAVENOUS at 11:57

## 2022-01-07 RX ADMIN — HYDROCHLOROTHIAZIDE 12.5 MG: 12.5 CAPSULE ORAL at 20:01

## 2022-01-07 RX ADMIN — MIDAZOLAM 1 MG: 1 INJECTION INTRAMUSCULAR; INTRAVENOUS at 07:32

## 2022-01-07 ASSESSMENT — MIFFLIN-ST. JEOR: SCORE: 1785.37

## 2022-01-07 NOTE — ANESTHESIA POSTPROCEDURE EVALUATION
Patient: Michael Crane    Procedure: Procedure(s):  LEFT TOTAL KNEE ARTHROPLASTY       Diagnosis:Osteoarthritis of left knee [M17.12]  Diagnosis Additional Information: No value filed.    Anesthesia Type:  Spinal    Note:  Disposition: Inpatient   Postop Pain Control: Uneventful            Sign Out: Well controlled pain   PONV: No   Neuro/Psych: Uneventful            Sign Out: Acceptable/Baseline neuro status   Airway/Respiratory: Uneventful            Sign Out: Acceptable/Baseline resp. status   CV/Hemodynamics: Uneventful            Sign Out: Acceptable CV status; No obvious hypovolemia; No obvious fluid overload   Other NRE: NONE   DID A NON-ROUTINE EVENT OCCUR? No           Last vitals:  Vitals Value Taken Time   /60 01/07/22 1000   Temp 37  C (98.6  F) 01/07/22 1000   Pulse 51 01/07/22 1014   Resp 16 01/07/22 1000   SpO2 97 % 01/07/22 1014   Vitals shown include unvalidated device data.    Electronically Signed By: Sahil Mccartney MD  January 7, 2022  10:16 AM

## 2022-01-07 NOTE — ANESTHESIA PROCEDURE NOTES
Intrathecal injection Procedure Note    Pre-Procedure   Staff -        Anesthesiologist:  Sahil Mccartney MD       Performed By: anesthesiologist       Location: OR       Procedure Start/Stop Times: 1/7/2022 7:30 AM and 1/7/2022 7:36 AM       Pre-Anesthestic Checklist: patient identified, IV checked, risks and benefits discussed, informed consent, monitors and equipment checked and pre-op evaluation  Timeout:       Correct Patient: Yes        Correct Procedure: Yes        Correct Site: Yes        Correct Position: Yes   Procedure Documentation  Procedure: intrathecal injection       Patient Position: sitting       Skin prep: Chloraprep       Insertion Site: L3-4. (midline approach).       Needle Gauge: 24.        Needle Length (Inches): 4        Spinal Needle Type: Pencan       Introducer used      # of attempts: 1 and  # of redirects:     Assessment/Narrative         Paresthesias: No.       CSF fluid: clear.    Medication(s) Administered   0.75% Hyperbaric Bupivacaine (Intrathecal), 1.8 mL  Medication Administration Time: 1/7/2022 7:36 AM     Comments:  Negative paresthesia or heme

## 2022-01-07 NOTE — ANESTHESIA CARE TRANSFER NOTE
Patient: Michael Crane    Procedure: Procedure(s):  LEFT TOTAL KNEE ARTHROPLASTY       Diagnosis: Osteoarthritis of left knee [M17.12]  Diagnosis Additional Information: No value filed.    Anesthesia Type:   Spinal     Note:    Oropharynx: oropharynx clear of all foreign objects  Level of Consciousness: awake  Oxygen Supplementation: face mask        Vital Signs Stable: post-procedure vital signs reviewed and stable  Report to RN Given: handoff report given  Patient transferred to: PACU    Handoff Report: Identifed the Patient, Identified the Reponsible Provider, Reviewed the pertinent medical history, Discussed the surgical course, Reviewed Intra-OP anesthesia mangement and issues during anesthesia, Set expectations for post-procedure period and Allowed opportunity for questions and acknowledgement of understanding      Vitals:  Vitals Value Taken Time   /58 01/07/22 0907   Temp 37.4  C (99.3  F) 01/07/22 0907   Pulse 74 01/07/22 0908   Resp 22 01/07/22 0908   SpO2 99 % 01/07/22 0908   Vitals shown include unvalidated device data.    Electronically Signed By: ROESLINE Carbone CRNA  January 7, 2022  9:09 AM

## 2022-01-07 NOTE — OP NOTE
DATE OF SERVICE: 1/7/2022     PREOPERATIVE DIAGNOSIS: Left knee osteoarthritis.     POSTOPERATIVE DIAGNOSIS: Left knee osteoarthritis.     Operation : Left Total Knee Arthroplasty    ANESTHESIA: Spinal .     PREP: Routine.     SURGEON: Henrik Shha MD.     ASSISTANT: Easton Sinha PA-C.     INDICATION: This patient is a  81 year old year-old person with osteoarthritis of left knee.   they has failed nonsurgical treatment. Risks and benefits were discussed regarding   surgical treatment.     The patient was brought to the operating room, placed supine, given a spinal   anesthetic, was given perioperative antibiotics.left leg was elevated, prepped   and draped in normal sterile fashion. Appropriate timeout was completed.   Tourniquet was inflated.    An incision was made just above the patella in tibial tubercle. Medial retinacular approach  then completed. She had large effusion which was evacuated. On inspection she had   grade 4 changes in the medial and patellofemoral compartments. Osteophytes were   excised. Next, after retractor was placed, a drill hole was placed in the distal   femur. Intramedullary guide was used and a 5 degree valgus cut of 10 mm of bone was   created. A sizing guide was placed. A size 6 implant was felt to be adequate in 3   degrees external rotation. Anterior, posterior and chamfer cuts were completed.   Next, attention was brought to the tibial side. Using external alignment guide, 4   mm of medial bone and 6 mm of lateral bone resected. PCL was spared. Meniscal   tissue was excised.   With a size 6 tibia, size 6  femur and a 11 mm insert the patient had full   extension with matched flexion and extension gaps. Alignment rods were used in   appropriate varus valgus.   Next, the patella was then cut at 35 mm with excellent patellofemoral tracking.   At this point in time the trial implants were removed. The tibia and femur were   prepared. After copious irrigation and drying using  Simplex cement, the size 6  Smith and Nephew baseplate was impacted. A size 11 CR insert was impacted, the size   6 femur was impacted, knee brought into extension, patella cemented, cement   cured. No problems identified. Knee was then copiously irrigated, infiltrated with   remaining part of the orthopedic injection. The tourniquet was released and bleeding controlled.The capsule closed  with 1 Vicryl suture, 2-0 Vicryl and Monocryl stitch. The knee was then injected with TXA.  Sterile compressive dressing.   The patient was then brought to recovery in stable condition. All sponge and   needle counts correct. No intraoperative complications identified. Blood loss was   approximately  25 mL. No specimens sent.    JACOB GARNER MD   1/7/2022

## 2022-01-07 NOTE — PLAN OF CARE
Patient vital signs are at baseline: Yes  Patient able to ambulate as they were prior to admission or with assist devices provided by therapies during their stay:  No,  Reason:  Patient still having numbness after surgery  Patient MUST void prior to discharge:  Yes  Patient able to tolerate oral intake:  Yes  Pain has adequate pain control using Oral analgesics:  Yes

## 2022-01-07 NOTE — ANESTHESIA PREPROCEDURE EVALUATION
Anesthesia Pre-Procedure Evaluation    Patient: Michael Crane   MRN: [id number] : 1940        Preoperative Diagnosis: Osteoarthritis of left knee [M17.12]    Procedure : Procedure(s):  LEFT TOTAL KNEE ARTHROPLASTY          Past Medical History:   Diagnosis Date     Antiplatelet or antithrombotic long-term use      Arthritis      Atrial fibrillation (H)      BPH (benign prostatic hyperplasia)      Diabetes (H)      Diverticulosis      Hyperlipidemia      Hypertension      Kidney stones      Left ventricular hypertrophy      Obese      PONV (postoperative nausea and vomiting)      PVCs (premature ventricular contractions)      Sleep apnea     borderline MEGAN      Past Surgical History:   Procedure Laterality Date     CARPAL TUNNEL RELEASE RT/LT Left 2021     COLONOSCOPY  2017     HERNIA REPAIR       and      PROSTATE BIOPSY       TURP       URETEROSCOPY  2018    with removal of kidney stone      Allergies   Allergen Reactions     Hydromorphone Nausea and Vomiting     Penicillins Rash      Social History     Tobacco Use     Smoking status: Former Smoker     Smokeless tobacco: Never Used   Substance Use Topics     Alcohol use: Not Currently     Comment: occasional      Wt Readings from Last 1 Encounters:   22 107.4 kg (236 lb 12.8 oz)        Anesthesia Evaluation            ROS/MED HX  ENT/Pulmonary:  - neg pulmonary ROS   (+) sleep apnea,     Neurologic:  - neg neurologic ROS     Cardiovascular:  - neg cardiovascular ROS   (+) hypertension-----Taking blood thinners     METS/Exercise Tolerance:     Hematologic:  - neg hematologic  ROS     Musculoskeletal:  - neg musculoskeletal ROS     GI/Hepatic:  - neg GI/hepatic ROS     Renal/Genitourinary:  - neg Renal ROS     Endo:  - neg endo ROS   (+) type I DM, Obesity,     Psychiatric/Substance Use:  - neg psychiatric ROS     Infectious Disease:  - neg infectious disease ROS     Malignancy:  - neg malignancy ROS     Other:  - neg other ROS           Physical Exam    Airway  airway exam normal      Mallampati: I   TM distance: > 3 FB   Neck ROM: limited   Mouth opening: > 3 cm    Respiratory Devices and Support         Dental  no notable dental history         Cardiovascular          Rhythm and rate: irregular     Pulmonary   pulmonary exam normal        breath sounds clear to auscultation           OUTSIDE LABS:  CBC: No results found for: WBC, HGB, HCT, PLT  BMP:   Lab Results   Component Value Date     (H) 01/07/2022     COAGS: No results found for: PTT, INR, FIBR  POC: No results found for: BGM, HCG, HCGS  HEPATIC: No results found for: ALBUMIN, PROTTOTAL, ALT, AST, GGT, ALKPHOS, BILITOTAL, BILIDIRECT, SAMMY  OTHER: No results found for: PH, LACT, A1C, LITO, PHOS, MAG, LIPASE, AMYLASE, TSH, T4, T3, CRP, SED    Anesthesia Plan    ASA Status:  3   NPO Status:  NPO Appropriate    Anesthesia Type: Spinal.              Consents    Anesthesia Plan(s) and associated risks, benefits, and realistic alternatives discussed. Questions answered and patient/representative(s) expressed understanding.    - Discussed:     - Discussed with:  Patient         Postoperative Care    Pain management: Peripheral nerve block (Single Shot).   PONV prophylaxis: Ondansetron (or other 5HT-3), Dexamethasone or Solumedrol     Comments:    Other Comments: Peripheral nerve block for post op analgesia per surgeon request.            Sahil Mccartney MD

## 2022-01-07 NOTE — PROGRESS NOTES
"Summary/Update  81-year-old man electively admitted by Dr. Lb Shah for elective left total knee replacement. Medical history is significant for chronic atrial fibrillation with tendency towards bradycardia on chronic anticoagulation for same, diabetes mellitus type 2 not requiring any intervention his last hemoglobin A1c was 6.5, preop cardiac evaluation clearing him for surgery which included a stress test reportedly unremarkable,      Assessment/Plan/Narrative    Active Hospital Problems   1 Status post total left knee replacement-satisfactory early postop recovery plan per orthopedics; likely home tomorrow if he continues to make progress     2 Atrial fibrillation with slow ventricular response (H)-  bradycardia noted postop EKG showed a heart rate of 54 he is not on any negative chronotropic agents we will continue to monitor this is likely chronic and asymptomatic     3 OA (osteoarthritis) see above      4 Chronic anticoagulation-  Eliquis is already been restarted by the orthopedic team     5  Diabetes mellitus (H  )-no special intervention in this regard he has been essentially \"diet-controlled\" with last A1c 6.5 which is quite acceptable for this 81-year-old man     6  Hypertension   outpatient medications reviewed and continue same    Past Medical History:   Diagnosis Date     Antiplatelet or antithrombotic long-term use      Arthritis      Atrial fibrillation (H)      BPH (benign prostatic hyperplasia)      Diabetes (H)      Diverticulosis      Hyperlipidemia      Hypertension      Kidney stones      Left ventricular hypertrophy      Obese      PONV (postoperative nausea and vomiting)      PVCs (premature ventricular contractions)      Sleep apnea     borderline MEGAN     Past Surgical History:   Procedure Laterality Date     CARPAL TUNNEL RELEASE RT/LT Left 02/2021     COLONOSCOPY  2017     HERNIA REPAIR      2004 and 2007     PROSTATE BIOPSY       TURP       URETEROSCOPY  2018    with removal of " kidney stone     Social History     Socioeconomic History     Marital status:      Spouse name: Not on file     Number of children: Not on file     Years of education: Not on file     Highest education level: Not on file   Occupational History     Not on file   Tobacco Use     Smoking status: Former Smoker     Smokeless tobacco: Never Used   Vaping Use     Vaping Use: Never used   Substance and Sexual Activity     Alcohol use: Not Currently     Comment: occasional     Drug use: Never     Sexual activity: Not on file   Other Topics Concern     Parent/sibling w/ CABG, MI or angioplasty before 65F 55M? Not Asked   Social History Narrative     Not on file     Social Determinants of Health     Financial Resource Strain: Not on file   Food Insecurity: Not on file   Transportation Needs: Not on file   Physical Activity: Not on file   Stress: Not on file   Social Connections: Not on file   Intimate Partner Violence: Not on file   Housing Stability: Not on file     History reviewed. No pertinent family history.        Code Status full code    Discharge Planning Home with wife    Subjective:  He is seen postop he has somewhat of a vague vacant type of affect but he is postanesthesia  his wife is here and she is comfortable with the situation he is not having any pain or other concerns    Objective:  Less than 10 mL of urine   Vital signs in last 24 hours  Temp:  [97.9  F (36.6  C)-99.3  F (37.4  C)] 97.9  F (36.6  C)  Pulse:  [38-91] 51  Resp:  [14-25] 18  BP: (103-133)/(52-81) 114/54  SpO2:  [93 %-100 %] 96 %  Weight:   [unfilled]    Intake/Output last 3 shifts  I/O last 3 completed shifts:  In: 1289.98 [I.V.:1137.98; IV Piggyback:152]  Out: 25 [Blood:25]  Intake/Output this shift:  No intake/output data recorded.      Physical Exam  General: Flat vague affect able to answer questions  VS-see above  HEENT:  Lungs: Clear  Cor: Bradycardic irregular consistent with atrial fib  ABD: Benign  Ext : Left leg wrapped    neuro: Flat affect somewhat vacant but he is postanesthesia not sure what baseline his wife is not concerned    Pertinent Labs   No results found for: WBC, HGB, HCT, MCV, PLT  No results found for: CREATININE, BUN, NA, CO2      Nato Prather MD.

## 2022-01-07 NOTE — PHARMACY-ADMISSION MEDICATION HISTORY
Pharmacy Note - Admission Medication History    Pertinent Provider Information: n/a   ______________________________________________________________________    Prior To Admission (PTA) med list completed and updated in EMR.       PTA Med List   Medication Sig Note Last Dose     apixaban ANTICOAGULANT (ELIQUIS) 5 MG tablet Take 5 mg by mouth 2 times daily Stopping 1/3/22 before surgery 1/4/2022: LD 1/4 1/4/2022 at 0700     APPLE CIDER VINEGAR PO Take 1 chew tab by mouth 3 times daily as needed   ~1 week     atorvastatin (LIPITOR) 20 MG tablet Take 20 mg by mouth daily  ~1 week     diphenhydrAMINE-acetaminophen (TYLENOL PM)  MG tablet Take 1 tablet by mouth nightly as needed for sleep  > 1 week     Ginger, Zingiber officinalis, (GINGER ROOT) 550 MG CAPS capsule Take 550 mg by mouth daily  ~1 week     glucosamine 500 MG CAPS capsule Take 500 mg by mouth daily Stopping 12/31/21 before surgery  ~1 week     hydrochlorothiazide (HYDRODIURIL) 12.5 MG tablet Take 12.5 mg by mouth At Bedtime   1/6/2022 at Unknown time     losartan (COZAAR) 50 MG tablet Take 50 mg by mouth daily  1/6/2022 at am     multivitamin w/minerals (THERA-VIT-M) tablet Take 1 tablet by mouth daily Stopping 12/31/21 before surgery  ~1 week     Specialty Vitamins Products (PROSTATE PO) Take 1 capsule by mouth daily Prostata supplement   Stopping 12/31/21 before surgery  ~1 week     Zn-Pyg Afri-Nettle-Saw Palmet (SAW PALMETTO COMPLEX PO) Take 1 tablet by mouth daily Stopping 12/31/21 before surgery  ~1 week       Information source(s): Patient and CareEverywhere/[facility]    Method of interview communication: in-person    Patient was asked about OTC/herbal products specifically.  PTA med list reflects this.    Based on the pharmacist's assessment, the PTA med list information appears reliable    Allergies were reviewed, assessed, and updated with the patient.      Patient does not use any multi-dose medications prior to admission.     Thank you for  the opportunity to participate in the care of this patient.      Michelle Palomino [facility]     1/7/2022     6:43 AM

## 2022-01-07 NOTE — PLAN OF CARE
Patient had low HR into 30s-40s. [facility] made aware. Ordered hospitalitis consult. Patient put on tele and EKG ordered.

## 2022-01-07 NOTE — ANESTHESIA PROCEDURE NOTES
Adductor canal Procedure Note    Pre-Procedure   Staff -        Anesthesiologist:  Sahil Mccartney MD       Performed By: anesthesiologist       Location: pre-op       Procedure Start/Stop Times: 1/7/2022 6:57 AM and 1/7/2022 7:02 AM       Pre-Anesthestic Checklist: patient identified, IV checked, site marked, risks and benefits discussed, informed consent, monitors and equipment checked, pre-op evaluation, at physician/surgeon's request and post-op pain management  Timeout:       Correct Patient: Yes        Correct Procedure: Yes        Correct Site: Yes        Correct Position: Yes        Correct Laterality: Yes        Site Marked: Yes  Procedure Documentation  Procedure: Adductor canal       Laterality: left       Patient Position: supine       Skin prep: Chloraprep       Needle Gauge: 20.        Needle Length (Inches): 4        Ultrasound guided       1. Ultrasound was used to identify targeted nerve, plexus, vascular marker, or fascial plane and place a needle adjacent to it in real-time.       2. Ultrasound was used to visualize the spread of anesthetic in close proximity to the above referenced structure.       3. A permanent image is entered into the patient's record.    Assessment/Narrative         The placement was negative for: blood aspirated, painful injection and site bleeding       Paresthesias: No.     Bolus given via needle..        Secured via.        Complications: none

## 2022-01-07 NOTE — PROGRESS NOTES
01/07/22 1545   Quick Adds   Type of Visit Initial PT Evaluation   Living Environment   People in home spouse   Current Living Arrangements house   Home Accessibility stairs to enter home;stairs within home   Number of Stairs, Main Entrance 3   Stair Railings, Main Entrance railing on right side (ascending)   Number of Stairs, Within Home, Primary none   Living Environment Comments has ramp to enter home if needed   Self-Care   Usual Activity Tolerance excellent   Current Activity Tolerance good   Disability/Function   Fall history within last six months no   General Information   Onset of Illness/Injury or Date of Surgery 01/07/22   Referring Physician Henrik Shah MD   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   Pertinent History of Current Problem (include personal factors and/or comorbidities that impact the POC) s/p TKA   Existing Precautions/Restrictions no pivoting or twisting   Weight-Bearing Status - LLE weight-bearing as tolerated   Weight-Bearing Status - RLE full weight-bearing   Transfers   Transfers sit-stand transfer   Sit-Stand Transfer   Sit-Stand [place] (Transfers) contact guard;verbal cues   Gait/Stairs (Locomotion)   [place] Level (Gait) contact guard;verbal cues   Assistive Device (Gait) walker, front-wheeled   Distance in Feet (Required for LE Total Joints) 75   Pattern (Gait) step-through   Deviations/Abnormal Patterns (Gait) gait speed decreased;stride length decreased   Clinical Impression   Criteria for Skilled Therapeutic Intervention yes, treatment indicated   PT Diagnosis (PT) impaired functional mobility   Influenced by the following impairments weakness, pain, decreased ROM   Functional limitations due to impairments gait, stairs, transfers   Clinical Presentation Stable/Uncomplicated   Clinical Presentation Rationale pt presents as medically diagnosed   Clinical Decision Making (Complexity) moderate complexity   Therapy Frequency (PT) 2x/day    Predicted Duration of Therapy Intervention (days/wks) 2 days   Planned Therapy Interventions (PT) bed mobility training;balance training;gait training;home exercise program;patient/family education;ROM (range of motion);stair training;strengthening;transfer training   Anticipated Equipment Needs at Discharge (PT) walker, rolling   Risk & Benefits of therapy have been explained care plan/treatment goals reviewed;patient   PT Discharge Planning    PT Discharge Recommendation (DC Rec) home with assist;home with outpatient physical therapy   PT Rationale for DC Rec home with assist from spouse as needed, OP PT for further rehabilitation   Total Evaluation Time   Total Evaluation Time (Minutes) 5

## 2022-01-08 ENCOUNTER — APPOINTMENT (OUTPATIENT)
Dept: PHYSICAL THERAPY | Facility: CLINIC | Age: 82
End: 2022-01-08
Attending: ORTHOPAEDIC SURGERY
Payer: MEDICARE

## 2022-01-08 ENCOUNTER — APPOINTMENT (OUTPATIENT)
Dept: OCCUPATIONAL THERAPY | Facility: CLINIC | Age: 82
End: 2022-01-08
Attending: ORTHOPAEDIC SURGERY
Payer: MEDICARE

## 2022-01-08 VITALS
SYSTOLIC BLOOD PRESSURE: 141 MMHG | RESPIRATION RATE: 18 BRPM | DIASTOLIC BLOOD PRESSURE: 64 MMHG | HEART RATE: 60 BPM | BODY MASS INDEX: 35.24 KG/M2 | HEIGHT: 70 IN | TEMPERATURE: 99.4 F | WEIGHT: 246.2 LBS | OXYGEN SATURATION: 95 %

## 2022-01-08 LAB
GLUCOSE BLDC GLUCOMTR-MCNC: 122 MG/DL (ref 70–99)
GLUCOSE BLDC GLUCOMTR-MCNC: 142 MG/DL (ref 70–99)
HGB BLD-MCNC: 12.7 G/DL (ref 13.3–17.7)

## 2022-01-08 PROCEDURE — 36415 COLL VENOUS BLD VENIPUNCTURE: CPT | Performed by: ORTHOPAEDIC SURGERY

## 2022-01-08 PROCEDURE — 99225 PR SUBSEQUENT OBSERVATION CARE,LEVEL II: CPT | Performed by: FAMILY MEDICINE

## 2022-01-08 PROCEDURE — 97110 THERAPEUTIC EXERCISES: CPT | Mod: GP | Performed by: PHYSICAL THERAPIST

## 2022-01-08 PROCEDURE — 250N000011 HC RX IP 250 OP 636: Performed by: ORTHOPAEDIC SURGERY

## 2022-01-08 PROCEDURE — 82962 GLUCOSE BLOOD TEST: CPT | Mod: 91

## 2022-01-08 PROCEDURE — 97116 GAIT TRAINING THERAPY: CPT | Mod: GP | Performed by: PHYSICAL THERAPIST

## 2022-01-08 PROCEDURE — 97166 OT EVAL MOD COMPLEX 45 MIN: CPT | Mod: GO

## 2022-01-08 PROCEDURE — 250N000013 HC RX MED GY IP 250 OP 250 PS 637: Performed by: ORTHOPAEDIC SURGERY

## 2022-01-08 PROCEDURE — 97530 THERAPEUTIC ACTIVITIES: CPT | Mod: GP | Performed by: PHYSICAL THERAPIST

## 2022-01-08 PROCEDURE — 97535 SELF CARE MNGMENT TRAINING: CPT | Mod: GO

## 2022-01-08 PROCEDURE — 85018 HEMOGLOBIN: CPT | Performed by: ORTHOPAEDIC SURGERY

## 2022-01-08 RX ORDER — AMOXICILLIN 250 MG
1 CAPSULE ORAL 2 TIMES DAILY
COMMUNITY
Start: 2022-01-08

## 2022-01-08 RX ORDER — OXYCODONE HYDROCHLORIDE 5 MG/1
5 TABLET ORAL EVERY 4 HOURS PRN
Qty: 30 TABLET | Refills: 0 | Status: SHIPPED | OUTPATIENT
Start: 2022-01-08

## 2022-01-08 RX ADMIN — SENNOSIDES AND DOCUSATE SODIUM 1 TABLET: 50; 8.6 TABLET ORAL at 10:01

## 2022-01-08 RX ADMIN — APIXABAN 5 MG: 5 TABLET, FILM COATED ORAL at 10:01

## 2022-01-08 RX ADMIN — ACETAMINOPHEN 975 MG: 325 TABLET ORAL at 04:25

## 2022-01-08 RX ADMIN — ATORVASTATIN CALCIUM 20 MG: 10 TABLET, FILM COATED ORAL at 10:01

## 2022-01-08 RX ADMIN — OXYCODONE HYDROCHLORIDE 10 MG: 5 TABLET ORAL at 13:15

## 2022-01-08 RX ADMIN — POLYETHYLENE GLYCOL 3350 17 G: 17 POWDER, FOR SOLUTION ORAL at 10:01

## 2022-01-08 RX ADMIN — CEFAZOLIN SODIUM 2 G: 2 INJECTION, SOLUTION INTRAVENOUS at 00:06

## 2022-01-08 RX ADMIN — OXYCODONE HYDROCHLORIDE 10 MG: 5 TABLET ORAL at 04:25

## 2022-01-08 RX ADMIN — OXYCODONE HYDROCHLORIDE 10 MG: 5 TABLET ORAL at 09:44

## 2022-01-08 RX ADMIN — ACETAMINOPHEN 975 MG: 325 TABLET ORAL at 12:33

## 2022-01-08 RX ADMIN — LOSARTAN POTASSIUM 50 MG: 50 TABLET, FILM COATED ORAL at 10:01

## 2022-01-08 ASSESSMENT — MIFFLIN-ST. JEOR: SCORE: 1828.01

## 2022-01-08 NOTE — PROGRESS NOTES
01/08/22 0735   Quick Adds   Type of Visit Initial Occupational Therapy Evaluation   Living Environment   People in home spouse   Current Living Arrangements house   Transportation Anticipated car, drives self   Living Environment Comments All main needs met on main floor. Pt has walkin shower w/ grab bars and has a seat. Pt has cane and FWW at home   Self-Care   Equipment Currently Used at Home cane, straight;grab bar, tub/shower;raised toilet seat;shower chair;walker, standard   Activity/Exercise/Self-Care Comment Pt was ind w/ all ADLsand IADLs at baseline   Disability/Function   Number of times patient has fallen within last six months 1   General Information   Onset of Illness/Injury or Date of Surgery 01/07/22   Referring Physician Carmelita Shah MD   Additional Occupational Profile Info/Pertinent History of Current Problem TKA   Existing Precautions/Restrictions fall   Left Lower Extremity (Weight-bearing Status) weight-bearing as tolerated (WBAT)   Cognitive Status Examination   Orientation Status orientation to person, place and time   Visual Perception   Visual Impairment/Limitations WFL   Sensory   Sensory Quick Adds No deficits were identified   Pain Assessment   Patient Currently in Pain No   Integumentary/Edema   Integumentary/Edema no deficits were identifed   Posture   Posture not impaired   Range of Motion Comprehensive   General Range of Motion bilateral upper extremity ROM WFL   Strength Comprehensive (MMT)   General Manual Muscle Testing (MMT) Assessment no strength deficits identified   Muscle Tone Assessment   Muscle Tone Quick Adds No deficits were identified   Coordination   Upper Extremity Coordination No deficits were identified   Transfers   Transfers bed-chair transfer;sit-stand transfer;toilet transfer;shower transfer   Transfer Comments Pt SBA-CGA for all transfers using FWW   Activities of Daily Living   BADL Assessment upper body dressing;lower body dressing;toileting    Upper Body Dressing Assessment   [place] Level (Upper Body Dressing) supervision   Lower Body Dressing Assessment   [place] Level (Lower Body Dressing) minimum assist (75% patient effort)   Toileting   [place] Level (Toileting) supervision   Clinical Impression   Criteria for Skilled Therapeutic Interventions Met (OT) yes   OT Diagnosis decreased ind w/ ADLs   OT Problem List-Impairments impacting ADL activity tolerance impaired;mobility;post-surgical precautions   Assessment of Occupational Performance 3-5 Performance Deficits   Identified Performance Deficits decreased ind w/ dressing, bathing, toileting   Planned Therapy Interventions (OT) ADL retraining;bed mobility training   Clinical Decision Making Complexity (OT) moderate complexity   Therapy Frequency (OT) 1x eval and treat   Predicted Duration of Therapy 1 day   Risk & Benefits of therapy have been explained patient   OT Discharge Planning    OT Discharge Recommendation (DC Rec) Home with assist   OT Rationale for DC Rec Pt tolerating pain well post op. He is well prepared at home w/ walk inshower, elevated toilet, hip kit, and grab bars throughout BR. Pt will have wife home at all times to assist as needed and all needs are met on main floor.

## 2022-01-08 NOTE — PROGRESS NOTES
"[facility] Progress Note      Post-operative Day: 1 Day Post-Op    Procedure(s):  LEFT TOTAL KNEE ARTHROPLASTY      Subjective:  Patient is doing well; did have increase in pain with physical therapy. Otherwise is eager to return home. Feels safe returning to home as he has handicap accessible home with ramp and shower chair.     Pain: moderate  Chest pain, SOB:  No      Objective:  Blood pressure (!) 141/64, pulse 60, temperature 99.4  F (37.4  C), temperature source Oral, resp. rate 18, height 1.778 m (5' 10\"), weight 111.7 kg (246 lb 3.2 oz), SpO2 95 %.    Patient Vitals for the past 24 hrs:   BP Temp Temp src Pulse Resp SpO2 Weight   01/08/22 0732 (!) 141/64 99.4  F (37.4  C) Oral 60 18 95 % --   01/08/22 0358 (!) 143/66 99.3  F (37.4  C) Oral 62 18 95 % 111.7 kg (246 lb 3.2 oz)   01/08/22 0000 136/60 99.3  F (37.4  C) Oral 73 16 93 % --   01/07/22 2225 118/62 98.5  F (36.9  C) Oral 60 20 98 % --   01/07/22 2000 116/60 -- -- -- -- -- --   01/07/22 1825 126/65 98.4  F (36.9  C) Oral 63 20 98 % --   01/07/22 1425 114/54 97.9  F (36.6  C) Oral 51 18 96 % --   01/07/22 1325 118/56 -- -- 51 -- -- --   01/07/22 1303 -- -- -- (!) 38 -- -- --   01/07/22 1247 -- -- -- (!) 45 -- -- --   01/07/22 1225 117/53 97.9  F (36.6  C) Oral 51 18 93 % --       Wt Readings from Last 4 Encounters:   01/08/22 111.7 kg (246 lb 3.2 oz)         Motor function, sensation, and circulation intact   Yes  Wound status: incisions are clean dry and intact. Yes  Calf tenderness: Bilateral  No    Pertinent Labs   Lab Results: personally reviewed.     Recent Labs   Lab Test 01/08/22  0815   HGB 12.7*       Plan: Anticoagulation protocol: Eliquis; resumed POD 1            Pain medications:  Acetaminophen, oxycodone             Weight bearing status:  WBAT             Disposition:  discharge home today             Continue cares and rehabilitation     Report completed by:  Kaylie Mojica PA-C  Date: 1/8/2022  Time: 11:55 AM    "

## 2022-01-08 NOTE — DISCHARGE SUMMARY
[facility] Summary                                  [facility]     EDNA GUDINO [id number]   Age: 81 year old  PCP: Pao Conrad, [phone] 1940     Date of Admission:  1/7/2022  Date of Discharge::  1/8/2022  Discharge Physician:  Kaylie Mojica PA-C    Code status:  Full Code    Admission Information:  Admission Diagnosis:  Osteoarthritis of left knee [M17.12]  OA (osteoarthritis) [M19.90]    Post-Operative Day: 1 Day Post-Op     Reason for admission:  The patient was admitted for the following:Procedure(s) (LRB):  LEFT TOTAL KNEE ARTHROPLASTY (Left)    Active Problems:    OA (osteoarthritis)    Status post total left knee replacement    Atrial fibrillation with slow ventricular response (H)    Diabetes mellitus (H)    Hypertension    Chronic anticoagulation      Allergies:  Hydromorphone and Penicillins    Following the procedure noted above the patient was transferred to the post-op floor and started on:    Therapy:  physical therapy  Anticoagulation Plan: Eliqunereyda preciado anticoagulant   Pain Management: acetaminophen, oxycodone   Weight bearing status: WBAT     The patient was followed and co-managed by the hospitalist service during the inpatient treatment course  Complications:  None  Consultations:  None     Pertinent Labs   Lab Results: personally reviewed.     Recent Labs   Lab Test 01/08/22  0815   HGB 12.7*          Discharge Information:  Condition at discharge: Stable  Discharge destination:  Discharged to home     Medications at discharge:  Current Discharge Medication List      START taking these medications    Details   acetaminophen (TYLENOL) 325 MG tablet Take 2 tablets (650 mg) by mouth every 4 hours as needed for other (mild pain)  Qty: 60 tablet, Refills: 0    Associated Diagnoses: Primary osteoarthritis of left knee      oxyCODONE (ROXICODONE) 5 MG tablet Take 1 tablet (5 mg) by mouth every 4 hours as needed for pain  Qty: 30 tablet, Refills: 0     Associated Diagnoses: Status post total left knee replacement      senna-docusate (SENOKOT-S/PERICOLACE) 8.6-50 MG tablet Take 1 tablet by mouth 2 times daily    Associated Diagnoses: Status post total left knee replacement         CONTINUE these medications which have NOT CHANGED    Details   apixaban ANTICOAGULANT (ELIQUIS) 5 MG tablet Take 5 mg by mouth 2 times daily Stopping 1/3/22 before surgery      APPLE CIDER VINEGAR PO Take 1 chew tab by mouth 3 times daily as needed       atorvastatin (LIPITOR) 20 MG tablet Take 20 mg by mouth daily      diphenhydrAMINE-acetaminophen (TYLENOL PM)  MG tablet Take 1 tablet by mouth nightly as needed for sleep      Ginger, Zingiber officinalis, (GINGER ROOT) 550 MG CAPS capsule Take 550 mg by mouth daily      glucosamine 500 MG CAPS capsule Take 500 mg by mouth daily Stopping 12/31/21 before surgery      hydrochlorothiazide (HYDRODIURIL) 12.5 MG tablet Take 12.5 mg by mouth At Bedtime       losartan (COZAAR) 50 MG tablet Take 50 mg by mouth daily      multivitamin w/minerals (THERA-VIT-M) tablet Take 1 tablet by mouth daily Stopping 12/31/21 before surgery      !! Specialty Vitamins Products (PROSTATE PO) Take 1 capsule by mouth daily Prostata supplement   Stopping 12/31/21 before surgery      !! Zn-Pyg Afri-Nettle-Saw Palmet (SAW PALMETTO COMPLEX PO) Take 1 tablet by mouth daily Stopping 12/31/21 before surgery       !! - Potential duplicate medications found. Please discuss with provider.                     Follow-Up Care:  Patient should be seen in the office in 14 days by the Orthopedic Surgeon/Physician Assistant.  Call [phone] for appointment or questions.    Kaylie Mojica PA-C

## 2022-01-08 NOTE — PLAN OF CARE
Patient vital signs are at baseline: Yes  Patient able to ambulate as they were prior to admission or with assist devices provided by therapies during their stay:  Yes  Patient MUST void prior to discharge:  Yes  Patient able to tolerate oral intake:  Yes  Pain has adequate pain control using Oral analgesics:  Yes    VSS on room air overnight. HR improved to 60's. PRN oxy given for moderate to severe pain with ambulation. Voiding adequately, retaining about 200 after each voiding occurrence. Ambulating SBA with gb and walker. Pt has some difficulty getting in/out of bed and sitting/getting up from toilet. Ace wrap CDI. Plan to discharge home pending morning therapies.

## 2022-01-08 NOTE — PLAN OF CARE
Patient vital signs are at baseline: No,  Reason:  bradycardic, HR in 50's  Patient able to ambulate as they were prior to admission or with assist devices provided by therapies during their stay:  Yes  Patient MUST void prior to discharge:  Yes  Patient able to tolerate oral intake:  Yes  Pain has adequate pain control using Oral analgesics:  Yes    Pt has only voided 1x since surgery. Will continue to encourage fluids and bladder scan for PVRs. Ax1 with gb and walker. ACHS bg checks, bg 142. Telemetry monitoring continued.

## 2022-01-08 NOTE — PLAN OF CARE
Problem: Adult Inpatient Plan of Care  Goal: Absence of Hospital-Acquired Illness or Injury  Intervention: Identify and Manage Fall Risk  Recent Flowsheet Documentation  Taken 1/7/2022 1350 by Adina Ramírez RN  Safety Promotion/Fall Prevention:   room door open   nonskid shoes/slippers when out of bed  Taken 1/7/2022 1141 by Adina Ramírez RN  Safety Promotion/Fall Prevention:   room door open   nonskid shoes/slippers when out of bed   Patient vitals stable. Patient had lower HR when arriving to floor at one point HR dropped to the 30s. RN notified MD. Please see other note. Patient is now on tele with 1st degree av block, prolonged QT, sinus capo, and PVCs. Patient reports that his HR is always in the 50s. CMS intact. Patient did state his buttocks and testicles/penis were numb for a prolonged period of time. Which made him feel like he could not void. Patient is passing gas.     Patient was bladder scanned for 472 mL. Patient was finally able to void at 1700 for 400 mL. PRV was 204. Patient stated he has a HX of urinary retention since he had BPH.

## 2022-01-08 NOTE — PLAN OF CARE
Patient vital signs are at baseline: Yes  Patient able to ambulate as they were prior to admission or with assist devices provided by therapies during their stay:  Yes  Patient MUST void prior to discharge:  Yes  Patient able to tolerate oral intake:  Yes  Pain has adequate pain control using Oral analgesics:  Yes    Discharge instructions went over with patient and wife and patient was given AVS and oxy script. Questions were answered and pt stated understanding and signed paperwork. Pt is aware of follow up appointments. Pt discharging to home with family transport.

## 2022-01-08 NOTE — PROGRESS NOTES
[facility] MEDICINE  PROGRESS NOTE     Code Status: Full Code  Procedure(s):  LEFT TOTAL KNEE ARTHROPLASTY  1 Day Post-Op  Identification/Summary:   81-year-old man electively admitted by Dr. Lb Shah for elective left total knee replacement. Medical history is significant for chronic atrial fibrillation with tendency towards bradycardia on chronic anticoagulation, diabetes mellitus type 2, last A1c 6.5.  Postoperatively had some bradycardia but now resolved.  No issues overnight.  Medically stable.     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  Working with therapy.  Discharge planning per orthopedics.  Atrial fibrillation with slow ventricular response (H)-  Bradycardia noted with postop EKG showed a heart rate of 54. Not on any negative chronotropic agents.  No events.  Okay to discontinue telemetry.  l  Likely chronic and asymptomatic  Chronic anticoagulation-  Eliquis is already been restarted by the orthopedic team  Diabetes mellitus diet controlled  A1c 6.5. Reasonable postoperative glucose controls.   Follow per standard protocols.   Recommend low carbohydrate diet.   Hypertension   Preoperative creatinine 1.1.  Continue home losartan and hydrochlorothiazide.  Acute blood loss anemia  Preoperative hemoglobin 14.9. Postoperative 12.7. No indication for transfusion at this time. Follow per protocols.    COVID-19 PCR negative from 1/4/2022  Noted. Standard precautions.  Fluids: Saline lock  Pain meds: Per orthopedics  Therapy: Per orthopedics  Sage:Not present  Current Diet  Orders Placed This Encounter      Discharge Instruction - Regular Diet Adult      Regular Diet Adult    Supplements  None    Barriers to Discharge: Medically cleared for discharge.  Further decision per orthopedics.    Disposition: Discharge med rec reviewed and our area of responsibility was addressed.    Interval  History/Subjective:  Patient doing well.  Will be working with therapy this afternoon.  Pain under reasonable control.  No chest pain.  No shortness of breath.  No nausea or vomiting.  No lightheadedness or dizziness.  Immediately after surgery did have some bradycardia but no events on telemetry overnight.  Notes that he does have upcoming sleep study.  Questions answered to verbalized satisfaction.    Physical Exam/Objective:  Temp:  [97.9  F (36.6  C)-99.4  F (37.4  C)] 99.4  F (37.4  C)  Pulse:  [38-73] 60  Resp:  [16-20] 18  BP: (110-143)/(53-66) 141/64  SpO2:  [93 %-98 %] 95 %  Wt Readings from Last 4 Encounters:   01/08/22 111.7 kg (246 lb 3.2 oz)     Body mass index is 35.33 kg/m .    Constitutional: awake, alert, cooperative, no apparent distress, and appears stated age  ENT: Normocephalic, without obvious abnormality, atraumatic, external ears without lesions, oral pharynx with moist mucous membranes, tonsils without erythema or exudates, gums normal and good dentition.  Respiratory: No increased work of breathing, good air exchange, clear to auscultation bilaterally, no crackles or wheezing  Cardiovascular: Normal apical impulse, irregular rhythm and mild bradycardia, normal S1 and S2, no S3 or S4, and no murmur noted  GI: No scars, normal bowel sounds, soft, non-distended, non-tender, no masses palpated, no hepatosplenomegally  Skin: normal skin color, texture, turgor, no redness, warmth, or swelling, and no rashes  Musculoskeletal: There is no redness, warmth, or swelling of the joints.  Motor strength is 5 out of 5 all extremities bilaterally.  Tone is normal. no lower extremity pitting edema present  Neurologic: Cranial nerves II-XII are grossly intact. Sensory:  Sensory intact  Neuropsychiatric: General: normal, calm and normal eye contact Level of consciousness: alert / normal Affect: normal Orientation: oriented to self, place, time and situation Memory and insight: normal, memory for past and  recent events intact and thought process normal      Medications:   Personally Reviewed.  Medications     lactated ringers 100 mL/hr at 01/07/22 2008       acetaminophen  975 mg Oral Q8H     apixaban ANTICOAGULANT  5 mg Oral BID     atorvastatin  20 mg Oral Daily     hydrochlorothiazide  12.5 mg Oral At Bedtime     losartan  50 mg Oral Daily     polyethylene glycol  17 g Oral Daily     senna-docusate  1 tablet Oral BID     sodium chloride (PF)  3 mL Intracatheter Q8H       Data reviewed today: I personally reviewed all new medications, labs, imaging/diagnostics reports over the past 24 hours. Pertinent findings include:    Imaging:   No results found for this or any previous visit (from the past 24 hour(s)).    Labs:  POC US Guidance Needle Placement   Final Result      POC US Guidance Needle Placement   Final Result      NUCLEAR CARDIAC - HIM SCAN   Final Result        Recent Results (from the past 24 hour(s))   ECG 12-LEAD WITH MUSE ([facility])    Collection Time: 01/07/22  3:32 PM   Result Value Ref Range    Systolic Blood Pressure  mmHg    Diastolic Blood Pressure  mmHg    Ventricular Rate 54 BPM    Atrial Rate 54 BPM    NC Interval 196 ms    QRS Duration 180 ms     ms    QTc 443 ms    P Axis 58 degrees    R AXIS 58 degrees    T Axis 51 degrees    Interpretation ECG       Sinus bradycardia with occasional Premature ventricular complexes  Right bundle branch block  Abnormal ECG  No previous ECGs available  Confirmed by TIERNEY BRITO MD LOC:[facility] ([id number]) on 1/7/2022 3:50:01 PM     Glucose by meter    Collection Time: 01/07/22  9:15 PM   Result Value Ref Range    GLUCOSE BY METER POCT 142 (H) 70 - 99 mg/dL   Glucose by meter    Collection Time: 01/08/22  6:32 AM   Result Value Ref Range    GLUCOSE BY METER POCT 142 (H) 70 - 99 mg/dL   Hemoglobin    Collection Time: 01/08/22  8:15 AM   Result Value Ref Range    Hemoglobin 12.7 (L) 13.3 - 17.7 g/dL       Pending Labs:  Unresulted Labs Ordered in the Past 30 Days of this  Admission     No orders found for last 31 day(s).            Shane Glass MD  [facility]  [facility] [place]  Phone: #[phone]

## 2022-01-08 NOTE — PLAN OF CARE
Physical Therapy Discharge Summary    Reason for therapy discharge:    Discharged to home with outpatient therapy.    Progress towards therapy goal(s). See goals on Care Plan in [facility] electronic health record for goal details.  Goals partially met.  Barriers to achieving goals:   pain.    Therapy recommendation(s):    Continued therapy is recommended.  Rationale/Recommendations:  Home PT and/or outpatient PT to address L knee pain, strength and ROM limitations.

## 2022-01-11 ENCOUNTER — DOCUMENTATION ONLY (OUTPATIENT)
Dept: OTHER | Facility: CLINIC | Age: 82
End: 2022-01-11
Payer: MEDICARE

## 2022-02-06 ENCOUNTER — HEALTH MAINTENANCE LETTER (OUTPATIENT)
Age: 82
End: 2022-02-06

## 2022-02-16 NOTE — TELEPHONE ENCOUNTER
---------------------  From: Gregoria Samaniego   To: Rod Oshea MD;     Sent: 7/23/2020 4:01:03 PM CDT  Subject: Referral Management     Patient has a home sleep study uploaded into [employer] ready for interpretation.---------------------  From: Rod Oshea MD   To: Gregoria Samaniego;     Sent: 7/23/2020 4:39:50 PM CDT  Subject: RE: Referral Management     done

## 2022-05-29 ENCOUNTER — HEALTH MAINTENANCE LETTER (OUTPATIENT)
Age: 82
End: 2022-05-29

## 2022-10-03 ENCOUNTER — HEALTH MAINTENANCE LETTER (OUTPATIENT)
Age: 82
End: 2022-10-03

## 2022-10-15 ENCOUNTER — MEDICAL CORRESPONDENCE (OUTPATIENT)
Dept: HEALTH INFORMATION MANAGEMENT | Facility: CLINIC | Age: 82
End: 2022-10-15

## 2023-02-12 ENCOUNTER — HEALTH MAINTENANCE LETTER (OUTPATIENT)
Age: 83
End: 2023-02-12

## 2023-05-20 ENCOUNTER — HEALTH MAINTENANCE LETTER (OUTPATIENT)
Age: 83
End: 2023-05-20

## 2023-10-22 ENCOUNTER — HEALTH MAINTENANCE LETTER (OUTPATIENT)
Age: 83
End: 2023-10-22

## 2024-03-09 ENCOUNTER — HEALTH MAINTENANCE LETTER (OUTPATIENT)
Age: 84
End: 2024-03-09

## 2024-07-27 ENCOUNTER — HEALTH MAINTENANCE LETTER (OUTPATIENT)
Age: 84
End: 2024-07-27

## 2024-12-14 ENCOUNTER — HEALTH MAINTENANCE LETTER (OUTPATIENT)
Age: 84
End: 2024-12-14

## (undated) DEVICE — HOLDER LIMB VELCRO OR 0814-1533

## (undated) DEVICE — BLADE SAW SAGITTAL STRK DUAL CUT 4118-135-090

## (undated) DEVICE — SOL NACL 0.9% IRRIG 1000ML BOTTLE 2F7124

## (undated) DEVICE — SOL WATER IRRIG 1000ML BOTTLE 2F7114

## (undated) DEVICE — CLOSURE SYS SKIN PREMIERPRO EXOFIN FUSION 4X22CM STRL 3472

## (undated) DEVICE — DRSG STERI STRIP 1X5" R1548

## (undated) DEVICE — SUTURE VICRYL+ 1 18 CT/CR  VLT VCP753D

## (undated) DEVICE — GLOVE SURG PI ULTRA TOUCH M SZ 7-1/2 LF

## (undated) DEVICE — PLATE GROUNDING ADULT W/CORD 9165L

## (undated) DEVICE — SOL NACL 0.9% INJ 1000ML BAG 2B1324X

## (undated) DEVICE — CUSTOM PACK TOTAL KNEE SOP5BTKHEC

## (undated) DEVICE — CUFF TOURN 34IN STRL DISP

## (undated) DEVICE — GLOVE UNDER INDICATOR PI SZ 8.5 LF 41685

## (undated) DEVICE — CUSTOM PACK TOTAL KNEE ACCESSORY SOP5BTAHEA

## (undated) DEVICE — NEEDLE HYPO 21GA X 1-1/2 SAFETY 305917

## (undated) DEVICE — GLOVE SURG PI ULTRA TOUCH M SZ 8-1/2 LF

## (undated) DEVICE — BANDAGE ELASTIC 4X550 LF DBL 593-94LF

## (undated) DEVICE — SUTURE VICRYL+ 2-0 27IN CT-1 UND VCP259H

## (undated) DEVICE — SUCTION MANIFOLD NEPTUNE 2 SYS 4 PORT 0702-020-000

## (undated) DEVICE — SU MONOCRYL 3-0 PS-2 18" UND MCP497G

## (undated) DEVICE — GLOVE BIOGEL PI INDICATOR 8.0 LF 41680

## (undated) DEVICE — HOOD FLYTE SURGICOOL

## (undated) DEVICE — DRSG AQUACEL AG HYDROFIBER  3.5X10" 422605